# Patient Record
Sex: FEMALE | Race: WHITE | NOT HISPANIC OR LATINO | Employment: UNEMPLOYED
[De-identification: names, ages, dates, MRNs, and addresses within clinical notes are randomized per-mention and may not be internally consistent; named-entity substitution may affect disease eponyms.]

---

## 2017-08-05 ENCOUNTER — HEALTH MAINTENANCE LETTER (OUTPATIENT)
Age: 37
End: 2017-08-05

## 2018-05-08 ENCOUNTER — THERAPY VISIT (OUTPATIENT)
Dept: PHYSICAL THERAPY | Facility: CLINIC | Age: 38
End: 2018-05-08
Payer: COMMERCIAL

## 2018-05-08 DIAGNOSIS — M54.50 ACUTE BILATERAL LOW BACK PAIN WITHOUT SCIATICA: Primary | ICD-10-CM

## 2018-05-08 PROCEDURE — 97110 THERAPEUTIC EXERCISES: CPT | Mod: GP | Performed by: PHYSICAL THERAPIST

## 2018-05-08 PROCEDURE — 97161 PT EVAL LOW COMPLEX 20 MIN: CPT | Mod: GP | Performed by: PHYSICAL THERAPIST

## 2018-05-08 PROCEDURE — 97530 THERAPEUTIC ACTIVITIES: CPT | Mod: GP | Performed by: PHYSICAL THERAPIST

## 2018-05-08 NOTE — LETTER
[facility] ATHLETIC [facility] PHYSICAL THERAPY  [address]  [facility] [address]  [phone]    May 10, 2018    Re: Petty Tolliver   :   1980  MRN:  [id number]   REFERRING PHYSICIAN:   Nabila Woodard    [facility] ATHLETIC [facility] PHYSICAL THERAPY    Date of Initial Evaluation:  2018  Visits:   1  Reason for Referral:  Acute bilateral low back pain without sciatica    [facility] Athletic [facility] Initial Evaluation -- Lumbar  Date: May 8, 2018  Petty Tolliver is a 38 year old female with a thoracic/lumbar spine condition.   Referral: GP  Work mechanical stresses:  NA  Employment status:  Homemaker  Leisure mechanical stresses: No formal exercise  Functional disability score (SHERRI/STarT Back):  Pt did not complete  VAS score (0-10): 4/10  Patient goals/expectations:  Reduce pain in back and be able to sit for longer periods of time    HISTORY:  Present symptoms: Central lower thoracic and lumbar spine  Pain quality (sharp/shooting/stabbing/aching/burning/cramping):  pinching   Paresthesia (yes/no):  No  Present since (onset date): 2018     Symptoms (improving/unchanging/worsening):  improving.   Symptoms commenced as a result of: Rocking in rocking chair and hit back on metal bar   Condition occurred in the following environment:   Home   Symptoms at onset (back/thigh/leg): Back  Constant symptoms (back/thigh/leg): None  Intermittent symptoms (back/thigh/leg): Back  Symptoms are made worse with the following: Always Sitting, Time of day - Always AM and Always When still   Symptoms are made better with the following: Always on the move, Other - ibuprofen, Sitting on swiss ball and bending forward  Disturbed sleep (yes/no):  No   Sleeping postures (prone/sup/side R/L): supine now, unable to sides  Previous episodes (0/1-5/6-10/11+): 1-5   Year of first episode:   Previous history: Yes  Previous treatments: PT      Re: Petty Tolliver   :    1980    Specific Questions:  Cough/Sneeze/Strain (pos/neg): Neg  Bowel/Bladder (normal/abnormal): Normal  Gait (normal/abnormal): Normal  Medications (nil/NSAIDS/analg/steroids/anticoag/other):  None  Medical allergies:  Dental anesthesia  General health (excellent/good/fair/poor):  Fair - good  Pertinent medical history:  Overweight, High blood pressure, Heart problems, Mental illness and Anemia  Imaging (None/Xray/MRI/Other):  X-ray  Recent or major surgery (yes/no):  Knee 1994  Night pain (yes/no): No  Accidents (yes/no): No  Unexplained weight loss (yes/no): No  Barriers at home: None  Other red flags: None    EXAMINATION    Posture:   Sitting (good/fair/poor): Fair  Standing (good/fair/poor):Fair  Lordosis (red/acc/normal): Normal  Correction of posture (better/worse/no effect): Better  Lateral Shift (right/left/nil): Nil  Relevant (yes/no):  NA  Other Observations: NT    Neurological:  Motor deficit:  NT  Reflexes:  NT  Sensory deficit:  NT  Dural signs:  NT    Movement Loss:   Marbin Mod Min Nil Pain   Flexion   X     Extension    X    Side Gliding R   X  Pulls LB   Side Gliding L   X  Pulls LB   Test Movements:   During: produces, abolishes, increases, decreases, no effect, centralizing, peripheralizing   After: better, worse, no better, no worse, no effect, centralized, peripheralized    Pretest symptoms standing:    Symptoms During Symptoms After ROM increased ROM decreased No Effect   FIS        Rep FIS        EIS        Rep EIS        Re: Petty Tolliver   :   1980    Pretest symptoms lying:     Symptoms During Symptoms After ROM increased ROM decreased No Effect   ABRAN        Rep ABRAN        EIL        Rep EIL        If required, pretest symptoms:    Symptoms During Symptoms After ROM increased ROM decreased No Effect   SGIS - R        Rep SGIS - R        SGIS - L        Rep SGIS - L          Static Tests:  Sitting slouched:    Sitting erect:    Standing slouched   Standing erect:    Lying prone  in extension:   Long sitting:      Other Tests: FISit - 2 x 10 reps - NE, NE, Inc ROM (FF, (B) SGIS and abolishes end range pulling)    Provisional Classification:  Derangement - Bilateral, symmetrical, symptoms above knee    Principle of Management:  Education:  Posture, specificity of exercise     Equipment provided:  Lumbar roll  Mechanical therapy (Y/N):  Y   Extension principle:      Lateral Principle:    Flexion principle:  FISit x 10-15 reps every 2 hrs    Other:      ASSESSMENT/PLAN:  Patient is a 38 year old female with lumbar complaints.    Patient has the following significant findings with corresponding treatment plan.                Diagnosis 1:  LBP  Pain -  self management, education, directional preference exercise and home program  Decreased ROM/flexibility - manual therapy, therapeutic exercise and home program  Decreased joint mobility - manual therapy, therapeutic exercise and home program  Impaired muscle performance - neuro re-education and home program  Decreased function - therapeutic activities and home program  Impaired posture - neuro re-education and home program            Re: Petty Tolliver   :   1980    Therapy Evaluation Codes:   1) History comprised of:   Personal factors that impact the plan of care:      None.    Comorbidity factors that impact the plan of care are:      Heart problems, High blood pressure, Mental illness and Overweight.     Medications impacting care: None.  2) Examination of Body Systems comprised of:   Body structures and functions that impact the plan of care:      Lumbar spine.   Activity limitations that impact the plan of care are:      Sitting.  3) Clinical presentation characteristics are:   Evolving/Changing.  4) Decision-Making    Low complexity using standardized patient assessment instrument and/or   measureable assessment of functional outcome.  Cumulative Therapy Evaluation is: Low complexity.    Previous and current functional limitations:   (See Goal Flow Sheet for this information)    Short term and Long term goals: (See Goal Flow Sheet for this information)   Communication ability:  Patient appears to be able to clearly communicate and understand verbal and written communication and follow directions correctly.  Treatment Explanation - The following has been discussed with the patient:   RX ordered/plan of care, Anticipated outcomes, Possible risks and side effects    This patient would benefit from PT intervention to resume normal activities.   Rehab potential is good.  Frequency:  1 X week, once daily  Duration:  for 6 weeks  Discharge Plan:  Achieve all LTG.  Independent in home treatment program.  Reach maximal therapeutic benefit.    Thank you for your referral.    INQUIRIES        Therapist:  MARY IglesiasT, cert MDT  [facility] PHYSICAL THERAPY  [address] [facility] [address]  [facility] [address]  Phone: [phone]  Fax: [phone]

## 2018-05-08 NOTE — PROGRESS NOTES
[facility] Initial Evaluation -- Lumbar    Date: May 8, 2018  Petty Tolliver is a 38 year old female with a thoracic/lumbar spine condition.   Referral: GP  Work mechanical stresses:  NA  Employment status:  Homemaker  Leisure mechanical stresses: No formal exercise  Functional disability score (SHERRI/STarT Back):  Pt did not complete  VAS score (0-10): 4/10  Patient goals/expectations:  Reduce pain in back and be able to sit for longer periods of time    HISTORY:    Present symptoms: Central lower thoracic and lumbar spine  Pain quality (sharp/shooting/stabbing/aching/burning/cramping):  pinching   Paresthesia (yes/no):  No    Present since (onset date): March 2018     Symptoms (improving/unchanging/worsening):  improving.     Symptoms commenced as a result of: Rocking in rocking chair and hit back on metal bar   Condition occurred in the following environment:   Home     Symptoms at onset (back/thigh/leg): Back  Constant symptoms (back/thigh/leg): None  Intermittent symptoms (back/thigh/leg): Back    Symptoms are made worse with the following: Always Sitting, Time of day - Always AM and Always When still   Symptoms are made better with the following: Always on the move, Other - ibuprofen, Sitting on swiss ball and bending forward    Disturbed sleep (yes/no):  No Sleeping postures (prone/sup/side R/L): supine now, unable to sides    Previous episodes (0/1-5/6-10/11+): 1-5 Year of first episode: 2009    Previous history: Yes  Previous treatments: PT      Specific Questions:  Cough/Sneeze/Strain (pos/neg): Neg  Bowel/Bladder (normal/abnormal): Normal  Gait (normal/abnormal): Normal  Medications (nil/NSAIDS/analg/steroids/anticoag/other):  None  Medical allergies:  Dental anesthesia  General health (excellent/good/fair/poor):  Fair - good  Pertinent medical history:  Overweight, High blood pressure, Heart problems, Mental illness and Anemia  Imaging (None/Xray/MRI/Other):  X-ray  Recent or major  surgery (yes/no):  Knee 1994  Night pain (yes/no): No  Accidents (yes/no): No  Unexplained weight loss (yes/no): No  Barriers at home: None  Other red flags: None    EXAMINATION    Posture:   Sitting (good/fair/poor): Fair  Standing (good/fair/poor):Fair  Lordosis (red/acc/normal): Normal  Correction of posture (better/worse/no effect): Better    Lateral Shift (right/left/nil): Nil  Relevant (yes/no):  NA  Other Observations: NT    Neurological:    Motor deficit:  NT  Reflexes:  NT  Sensory deficit:  NT  Dural signs:  NT    Movement Loss:   Marbin Mod Min Nil Pain   Flexion   X     Extension    X    Side Gliding R   X  Pulls LB   Side Gliding L   X  Pulls LB     Test Movements:   During: produces, abolishes, increases, decreases, no effect, centralizing, peripheralizing   After: better, worse, no better, no worse, no effect, centralized, peripheralized    Pretest symptoms standing:    Symptoms During Symptoms After ROM increased ROM decreased No Effect   FIS        Rep FIS        EIS        Rep EIS        Pretest symptoms lying:     Symptoms During Symptoms After ROM increased ROM decreased No Effect   ABRAN        Rep ABRAN        EIL        Rep EIL        If required, pretest symptoms:    Symptoms During Symptoms After ROM increased ROM decreased No Effect   SGIS - R        Rep SGIS - R        SGIS - L        Rep SGIS - L          Static Tests:  Sitting slouched:    Sitting erect:    Standing slouched   Standing erect:    Lying prone in extension:   Long sitting:      Other Tests: FISit - 2 x 10 reps - NE, NE, Inc ROM (FF, (B) SGIS and abolishes end range pulling)    Provisional Classification:  Derangement - Bilateral, symmetrical, symptoms above knee    Principle of Management:  Education:  Posture, specificity of exercise   Equipment provided:  Lumbar roll  Mechanical therapy (Y/N):  Y   Extension principle:    Lateral Principle:    Flexion principle:  FISit x 10-15 reps every 2 hrs  Other:       ASSESSMENT/PLAN:    Patient is a 38 year old female with lumbar complaints.    Patient has the following significant findings with corresponding treatment plan.                Diagnosis 1:  LBP    Pain -  self management, education, directional preference exercise and home program  Decreased ROM/flexibility - manual therapy, therapeutic exercise and home program  Decreased joint mobility - manual therapy, therapeutic exercise and home program  Impaired muscle performance - neuro re-education and home program  Decreased function - therapeutic activities and home program  Impaired posture - neuro re-education and home program    Therapy Evaluation Codes:   1) History comprised of:   Personal factors that impact the plan of care:      None.    Comorbidity factors that impact the plan of care are:      Heart problems, High blood pressure, Mental illness and Overweight.     Medications impacting care: None.  2) Examination of Body Systems comprised of:   Body structures and functions that impact the plan of care:      Lumbar spine.   Activity limitations that impact the plan of care are:      Sitting.  3) Clinical presentation characteristics are:   Evolving/Changing.  4) Decision-Making    Low complexity using standardized patient assessment instrument and/or measureable assessment of functional outcome.  Cumulative Therapy Evaluation is: Low complexity.    Previous and current functional limitations:  (See Goal Flow Sheet for this information)    Short term and Long term goals: (See Goal Flow Sheet for this information)     Communication ability:  Patient appears to be able to clearly communicate and understand verbal and written communication and follow directions correctly.  Treatment Explanation - The following has been discussed with the patient:   RX ordered/plan of care  Anticipated outcomes  Possible risks and side effects  This patient would benefit from PT intervention to resume normal activities.   Rehab  potential is good.    Frequency:  1 X week, once daily  Duration:  for 6 weeks  Discharge Plan:  Achieve all LTG.  Independent in home treatment program.  Reach maximal therapeutic benefit.    Please refer to the daily flowsheet for treatment today, total treatment time and time spent performing 1:1 timed codes.

## 2018-05-15 ENCOUNTER — THERAPY VISIT (OUTPATIENT)
Dept: PHYSICAL THERAPY | Facility: CLINIC | Age: 38
End: 2018-05-15
Payer: COMMERCIAL

## 2018-05-15 DIAGNOSIS — M54.50 ACUTE BILATERAL LOW BACK PAIN WITHOUT SCIATICA: ICD-10-CM

## 2018-05-15 PROCEDURE — 97110 THERAPEUTIC EXERCISES: CPT | Mod: GP | Performed by: PHYSICAL THERAPIST

## 2018-05-15 NOTE — PROGRESS NOTES
Subjective:  HPI                    Objective:  System    Physical Exam    General     ROS    Assessment/Plan:    PROGRESS  REPORT    Progress reporting period is from 5.8.18 to 5.15.18.       SUBJECTIVE  Subjective changes noted by patient:  Pt reports pain is much better.  Not noticing hardly any pain except if sitting for prolonged period will feel pain localized in low back region but sitting has been much better using lumbar roll.  Has been doing stretch 3-4 x daily.    Current Pain level: 0/10.     Initial Pain level: 4/10.   Changes in function:  Yes (See Goal flowsheet attached for changes in current functional level)  Adverse reaction to treatment or activity: None    OBJECTIVE  L/S AROM:  Flex WNL; Ext WNL; SG (R) WNL/end range pull Left lat hip that abolishes after performing repeated FISit (L) WNL.     ASSESSMENT/PLAN  Updated problem list and treatment plan: Diagnosis 1:  LBP    Decreased ROM/flexibility - therapeutic exercise and home program  Decreased joint mobility - therapeutic exercise and home program  Impaired posture - neuro re-education and home program  STG/LTGs have been met or progress has been made towards goals:  Yes (See Goal flow sheet completed today.)  Assessment of Progress: The patient's condition is improving.  The patient has met all of their long term goals.  Self Management Plans:  Patient is independent in a home treatment program.  Patient is independent in self management of symptoms.  I have re-evaluated this patient and find that the nature, scope, duration and intensity of the therapy is appropriate for the medical condition of the patient.  Petty continues to require the following intervention to meet STG and LTG's:  PT intervention is no longer required to meet STG/LTG.    Recommendations:  Pt will continue with HEP as instructed.  Follow up prn over the next 6 weeks if problems arise/symptoms worsen.  D/C at that time if pt does not return and this progress note to  serve as D/C status.      Please refer to the daily flowsheet for treatment today, total treatment time and time spent performing 1:1 timed codes.

## 2018-05-15 NOTE — LETTER
[facility] ATHLETIC [facility] PHYSICAL THERAPY  [address]  [facility] [address]  [phone]    May 16, 2018    Re: Petty Tolliver   :   1980  MRN:  [id number]   REFERRING PHYSICIAN:   Nabila Woodard    [facility] ATHLETIC [facility] PHYSICAL THERAPY    Date of Initial Evaluation:  2018  Visits:    2  Reason for Referral:  Acute bilateral low back pain without sciatica    PROGRESS  REPORT: Progress reporting period is from 5.8.18 to 5.15.18.       SUBJECTIVE  Subjective changes noted by patient:  Pt reports pain is much better.  Not noticing hardly any pain except if sitting for prolonged period will feel pain localized in low back region but sitting has been much better using lumbar roll.  Has been doing stretch 3-4 x daily.    Current Pain level: 0/10.   Initial Pain level: 4/10.   Changes in function:  Yes (See Goal flowsheet attached for changes in current functional level). Adverse reaction to treatment or activity: None    OBJECTIVE  L/S AROM:  Flex WNL; Ext WNL; SG (R) WNL/end range pull Left lat hip that abolishes after performing repeated FISit (L) WNL.     ASSESSMENT/PLAN  Updated problem list and treatment plan: Diagnosis 1:  LBP  Decreased ROM/flexibility - therapeutic exercise and home program  Decreased joint mobility - therapeutic exercise and home program  Impaired posture - neuro re-education and home program  STG/LTGs have been met or progress has been made towards goals:  Yes (See Goal flow sheet completed today.)  Assessment of Progress: The patient's condition is improving.  The patient has met all of their long term goals.  Self Management Plans:  Patient is independent in a home treatment program.  Patient is independent in self management of symptoms.  I have re-evaluated this patient and find that the nature, scope, duration and intensity of the therapy is appropriate for the medical condition of the patient.  Petty continues to require  the following intervention to meet STG and LTG's:  PT intervention is no longer required to meet STG/LTG.              Re: Petty ALE Tolliver   :   1980    Recommendations:  Pt will continue with HEP as instructed.  Follow up prn over the next 6 weeks if problems arise/symptoms worsen.  D/C at that time if pt does not return and this progress note to serve as D/C status.    Thank you for your referral.    INQUIRIES        Therapist:  MARY IglesiasT, cert MDT  [facility] PHYSICAL THERAPY  [address] [facility] [address]  [facility] [address]  Phone: [phone]  Fax: [phone]

## 2020-01-14 PROBLEM — M54.50 ACUTE BILATERAL LOW BACK PAIN WITHOUT SCIATICA: Status: RESOLVED | Noted: 2018-05-08 | Resolved: 2020-01-14

## 2020-02-16 ENCOUNTER — HEALTH MAINTENANCE LETTER (OUTPATIENT)
Age: 40
End: 2020-02-16

## 2021-05-26 ENCOUNTER — RECORDS - HEALTHEAST (OUTPATIENT)
Dept: ADMINISTRATIVE | Facility: CLINIC | Age: 41
End: 2021-05-26

## 2021-05-27 ENCOUNTER — RECORDS - HEALTHEAST (OUTPATIENT)
Dept: ADMINISTRATIVE | Facility: CLINIC | Age: 41
End: 2021-05-27

## 2022-03-04 ENCOUNTER — LAB REQUISITION (OUTPATIENT)
Dept: LAB | Facility: CLINIC | Age: 42
End: 2022-03-04
Payer: COMMERCIAL

## 2022-03-04 DIAGNOSIS — Z01.818 ENCOUNTER FOR OTHER PREPROCEDURAL EXAMINATION: ICD-10-CM

## 2022-03-04 PROCEDURE — U0005 INFEC AGEN DETEC AMPLI PROBE: HCPCS | Mod: ORL | Performed by: NURSE PRACTITIONER

## 2022-03-05 LAB — SARS-COV-2 RNA RESP QL NAA+PROBE: NEGATIVE

## 2022-08-05 ENCOUNTER — LAB REQUISITION (OUTPATIENT)
Dept: LAB | Facility: CLINIC | Age: 42
End: 2022-08-05

## 2022-08-05 DIAGNOSIS — E55.9 VITAMIN D DEFICIENCY, UNSPECIFIED: ICD-10-CM

## 2022-08-05 DIAGNOSIS — D50.0 IRON DEFICIENCY ANEMIA SECONDARY TO BLOOD LOSS (CHRONIC): ICD-10-CM

## 2022-08-05 LAB — FERRITIN SERPL-MCNC: 50 NG/ML (ref 6–175)

## 2022-08-05 PROCEDURE — 82306 VITAMIN D 25 HYDROXY: CPT | Performed by: FAMILY MEDICINE

## 2022-08-05 PROCEDURE — 82728 ASSAY OF FERRITIN: CPT | Performed by: FAMILY MEDICINE

## 2022-08-05 PROCEDURE — 83550 IRON BINDING TEST: CPT | Performed by: FAMILY MEDICINE

## 2022-08-06 LAB
IRON SATN MFR SERPL: 40 % (ref 15–46)
IRON SERPL-MCNC: 133 UG/DL (ref 35–180)
TIBC SERPL-MCNC: 331 UG/DL (ref 240–430)

## 2022-08-08 LAB — DEPRECATED CALCIDIOL+CALCIFEROL SERPL-MC: 57 UG/L (ref 20–75)

## 2022-09-02 ENCOUNTER — LAB REQUISITION (OUTPATIENT)
Dept: LAB | Facility: CLINIC | Age: 42
End: 2022-09-02

## 2022-09-02 DIAGNOSIS — R68.89 OTHER GENERAL SYMPTOMS AND SIGNS: ICD-10-CM

## 2022-09-02 DIAGNOSIS — Z13.6 ENCOUNTER FOR SCREENING FOR CARDIOVASCULAR DISORDERS: ICD-10-CM

## 2022-09-02 DIAGNOSIS — Z13.1 ENCOUNTER FOR SCREENING FOR DIABETES MELLITUS: ICD-10-CM

## 2022-09-02 LAB
GLUCOSE SERPL-MCNC: 78 MG/DL (ref 70–99)
TSH SERPL DL<=0.005 MIU/L-ACNC: 1.11 UIU/ML (ref 0.3–4.2)

## 2022-09-02 PROCEDURE — 80061 LIPID PANEL: CPT | Performed by: FAMILY MEDICINE

## 2022-09-02 PROCEDURE — 84443 ASSAY THYROID STIM HORMONE: CPT | Performed by: FAMILY MEDICINE

## 2022-09-02 PROCEDURE — 82947 ASSAY GLUCOSE BLOOD QUANT: CPT | Performed by: FAMILY MEDICINE

## 2022-09-03 LAB
CHOLEST SERPL-MCNC: 236 MG/DL
HDLC SERPL-MCNC: 37 MG/DL
LDLC SERPL CALC-MCNC: 167 MG/DL
NONHDLC SERPL-MCNC: 199 MG/DL
TRIGL SERPL-MCNC: 159 MG/DL

## 2023-05-09 ENCOUNTER — LAB REQUISITION (OUTPATIENT)
Dept: LAB | Facility: CLINIC | Age: 43
End: 2023-05-09

## 2023-05-09 DIAGNOSIS — R19.5 OTHER FECAL ABNORMALITIES: ICD-10-CM

## 2023-05-09 PROCEDURE — 83516 IMMUNOASSAY NONANTIBODY: CPT | Performed by: FAMILY MEDICINE

## 2023-05-09 PROCEDURE — 82784 ASSAY IGA/IGD/IGG/IGM EACH: CPT | Performed by: FAMILY MEDICINE

## 2023-05-11 ENCOUNTER — LAB REQUISITION (OUTPATIENT)
Dept: LAB | Facility: CLINIC | Age: 43
End: 2023-05-11

## 2023-05-11 DIAGNOSIS — R19.5 OTHER FECAL ABNORMALITIES: ICD-10-CM

## 2023-05-11 LAB
C DIFF TOX B STL QL: NEGATIVE
GLIADIN IGA SER-ACNC: 1.1 U/ML
GLIADIN IGG SER-ACNC: <0.6 U/ML
IGA SERPL-MCNC: 117 MG/DL (ref 84–499)
TTG IGA SER-ACNC: 0.2 U/ML
TTG IGG SER-ACNC: 1 U/ML

## 2023-05-11 PROCEDURE — 87209 SMEAR COMPLEX STAIN: CPT | Performed by: FAMILY MEDICINE

## 2023-05-11 PROCEDURE — 87506 IADNA-DNA/RNA PROBE TQ 6-11: CPT | Performed by: FAMILY MEDICINE

## 2023-05-11 PROCEDURE — 87493 C DIFF AMPLIFIED PROBE: CPT | Performed by: FAMILY MEDICINE

## 2023-05-12 LAB
C COLI+JEJUNI+LARI FUSA STL QL NAA+PROBE: NOT DETECTED
EC STX1 GENE STL QL NAA+PROBE: NOT DETECTED
EC STX2 GENE STL QL NAA+PROBE: NOT DETECTED
NOROV GI+II ORF1-ORF2 JNC STL QL NAA+PR: NOT DETECTED
O+P STL MICRO: NEGATIVE
RVA NSP5 STL QL NAA+PROBE: NOT DETECTED
SALMONELLA SP RPOD STL QL NAA+PROBE: NOT DETECTED
SHIGELLA SP+EIEC IPAH STL QL NAA+PROBE: NOT DETECTED
V CHOL+PARA RFBL+TRKH+TNAA STL QL NAA+PR: NOT DETECTED
Y ENTERO RECN STL QL NAA+PROBE: NOT DETECTED

## 2023-09-08 ENCOUNTER — LAB REQUISITION (OUTPATIENT)
Dept: LAB | Facility: CLINIC | Age: 43
End: 2023-09-08

## 2023-09-08 DIAGNOSIS — E78.2 MIXED HYPERLIPIDEMIA: ICD-10-CM

## 2023-09-08 LAB
ALBUMIN SERPL BCG-MCNC: 4.1 G/DL (ref 3.5–5.2)
ALP SERPL-CCNC: 79 U/L (ref 35–104)
ALT SERPL W P-5'-P-CCNC: 23 U/L (ref 0–50)
ANION GAP SERPL CALCULATED.3IONS-SCNC: 13 MMOL/L (ref 7–15)
AST SERPL W P-5'-P-CCNC: 23 U/L (ref 0–45)
BILIRUB SERPL-MCNC: 0.3 MG/DL
BUN SERPL-MCNC: 12.1 MG/DL (ref 6–20)
CALCIUM SERPL-MCNC: 8.8 MG/DL (ref 8.6–10)
CHLORIDE SERPL-SCNC: 104 MMOL/L (ref 98–107)
CHOLEST SERPL-MCNC: 249 MG/DL
CREAT SERPL-MCNC: 0.62 MG/DL (ref 0.51–0.95)
DEPRECATED HCO3 PLAS-SCNC: 20 MMOL/L (ref 22–29)
EGFRCR SERPLBLD CKD-EPI 2021: >90 ML/MIN/1.73M2
GLUCOSE SERPL-MCNC: 86 MG/DL (ref 70–99)
HDLC SERPL-MCNC: 33 MG/DL
LDLC SERPL CALC-MCNC: 186 MG/DL
NONHDLC SERPL-MCNC: 216 MG/DL
POTASSIUM SERPL-SCNC: 4.2 MMOL/L (ref 3.4–5.3)
PROT SERPL-MCNC: 6.7 G/DL (ref 6.4–8.3)
SODIUM SERPL-SCNC: 137 MMOL/L (ref 136–145)
TRIGL SERPL-MCNC: 149 MG/DL

## 2023-09-08 PROCEDURE — 80053 COMPREHEN METABOLIC PANEL: CPT | Performed by: FAMILY MEDICINE

## 2023-09-08 PROCEDURE — 80061 LIPID PANEL: CPT | Performed by: FAMILY MEDICINE

## 2023-12-19 ENCOUNTER — LAB REQUISITION (OUTPATIENT)
Dept: LAB | Facility: CLINIC | Age: 43
End: 2023-12-19

## 2023-12-19 DIAGNOSIS — R63.1 POLYDIPSIA: ICD-10-CM

## 2023-12-19 DIAGNOSIS — R35.0 FREQUENCY OF MICTURITION: ICD-10-CM

## 2023-12-19 DIAGNOSIS — R03.0 ELEVATED BLOOD-PRESSURE READING, WITHOUT DIAGNOSIS OF HYPERTENSION: ICD-10-CM

## 2023-12-19 DIAGNOSIS — J02.9 ACUTE PHARYNGITIS, UNSPECIFIED: ICD-10-CM

## 2023-12-19 LAB
ALBUMIN SERPL BCG-MCNC: 4.1 G/DL (ref 3.5–5.2)
ALP SERPL-CCNC: 74 U/L (ref 40–150)
ALT SERPL W P-5'-P-CCNC: 15 U/L (ref 0–50)
ANION GAP SERPL CALCULATED.3IONS-SCNC: 13 MMOL/L (ref 7–15)
AST SERPL W P-5'-P-CCNC: 16 U/L (ref 0–45)
BILIRUB SERPL-MCNC: 0.2 MG/DL
BUN SERPL-MCNC: 7.1 MG/DL (ref 6–20)
CALCIUM SERPL-MCNC: 9 MG/DL (ref 8.6–10)
CHLORIDE SERPL-SCNC: 105 MMOL/L (ref 98–107)
CREAT SERPL-MCNC: 0.6 MG/DL (ref 0.51–0.95)
DEPRECATED HCO3 PLAS-SCNC: 22 MMOL/L (ref 22–29)
EGFRCR SERPLBLD CKD-EPI 2021: >90 ML/MIN/1.73M2
GLUCOSE SERPL-MCNC: 85 MG/DL (ref 70–99)
POTASSIUM SERPL-SCNC: 4.5 MMOL/L (ref 3.4–5.3)
PROT SERPL-MCNC: 7.6 G/DL (ref 6.4–8.3)
SODIUM SERPL-SCNC: 140 MMOL/L (ref 135–145)
TSH SERPL DL<=0.005 MIU/L-ACNC: 1.89 UIU/ML (ref 0.3–4.2)

## 2023-12-19 PROCEDURE — 87070 CULTURE OTHR SPECIMN AEROBIC: CPT | Performed by: FAMILY MEDICINE

## 2023-12-19 PROCEDURE — 80053 COMPREHEN METABOLIC PANEL: CPT | Performed by: FAMILY MEDICINE

## 2023-12-19 PROCEDURE — 84443 ASSAY THYROID STIM HORMONE: CPT | Performed by: FAMILY MEDICINE

## 2023-12-19 PROCEDURE — 87086 URINE CULTURE/COLONY COUNT: CPT | Performed by: FAMILY MEDICINE

## 2023-12-21 LAB
BACTERIA SPEC CULT: NORMAL
BACTERIA UR CULT: NORMAL

## 2024-07-13 ENCOUNTER — LAB REQUISITION (OUTPATIENT)
Dept: LAB | Facility: CLINIC | Age: 44
End: 2024-07-13

## 2024-07-13 DIAGNOSIS — S70.362A INSECT BITE (NONVENOMOUS), LEFT THIGH, INITIAL ENCOUNTER (CODE): ICD-10-CM

## 2024-07-13 PROCEDURE — 87798 DETECT AGENT NOS DNA AMP: CPT | Performed by: FAMILY MEDICINE

## 2024-07-13 PROCEDURE — 86618 LYME DISEASE ANTIBODY: CPT | Performed by: FAMILY MEDICINE

## 2024-07-14 LAB
A PHAGOCYTOPH DNA BLD QL NAA+PROBE: NOT DETECTED
BABESIA DNA BLD QL NAA+PROBE: NOT DETECTED
EHRLICHIA DNA SPEC QL NAA+PROBE: NOT DETECTED

## 2024-07-15 LAB — B BURGDOR IGG+IGM SER QL: 0.18

## 2024-08-19 ENCOUNTER — LAB REQUISITION (OUTPATIENT)
Dept: LAB | Facility: HOSPITAL | Age: 44
End: 2024-08-19
Payer: COMMERCIAL

## 2024-08-19 DIAGNOSIS — E53.9 VITAMIN B DEFICIENCY, UNSPECIFIED: ICD-10-CM

## 2024-08-19 DIAGNOSIS — K14.0 GLOSSITIS: ICD-10-CM

## 2024-08-19 DIAGNOSIS — E16.2 HYPOGLYCEMIA, UNSPECIFIED: ICD-10-CM

## 2024-08-19 DIAGNOSIS — H81.399 OTHER PERIPHERAL VERTIGO, UNSPECIFIED EAR: ICD-10-CM

## 2024-08-19 LAB
FOLATE SERPL-MCNC: 11.6 NG/ML (ref 4.6–34.8)
HBA1C MFR BLD: 5.3 %
IRON SERPL-MCNC: 75 UG/DL (ref 37–145)

## 2024-08-19 PROCEDURE — 84630 ASSAY OF ZINC: CPT | Mod: ORL | Performed by: OTOLARYNGOLOGY

## 2024-08-19 PROCEDURE — 83540 ASSAY OF IRON: CPT | Mod: ORL | Performed by: OTOLARYNGOLOGY

## 2024-08-19 PROCEDURE — 36415 COLL VENOUS BLD VENIPUNCTURE: CPT | Mod: ORL | Performed by: OTOLARYNGOLOGY

## 2024-08-19 PROCEDURE — 82607 VITAMIN B-12: CPT | Mod: ORL | Performed by: OTOLARYNGOLOGY

## 2024-08-19 PROCEDURE — 84207 ASSAY OF VITAMIN B-6: CPT | Mod: ORL | Performed by: OTOLARYNGOLOGY

## 2024-08-19 PROCEDURE — 83036 HEMOGLOBIN GLYCOSYLATED A1C: CPT | Mod: ORL | Performed by: OTOLARYNGOLOGY

## 2024-08-19 PROCEDURE — 82746 ASSAY OF FOLIC ACID SERUM: CPT | Mod: ORL | Performed by: OTOLARYNGOLOGY

## 2024-08-20 LAB — VIT B12 SERPL-MCNC: 293 PG/ML (ref 232–1245)

## 2024-08-22 LAB
PYRIDOXAL PHOS SERPL-SCNC: 25.7 NMOL/L
ZINC SERPL-MCNC: 67 UG/DL

## 2024-08-24 ENCOUNTER — HOSPITAL ENCOUNTER (EMERGENCY)
Facility: HOSPITAL | Age: 44
Discharge: HOME | End: 2024-08-24
Attending: EMERGENCY MEDICINE | Admitting: EMERGENCY MEDICINE
Payer: COMMERCIAL

## 2024-08-24 ENCOUNTER — APPOINTMENT (OUTPATIENT)
Dept: RADIOLOGY | Facility: HOSPITAL | Age: 44
End: 2024-08-24
Attending: EMERGENCY MEDICINE
Payer: COMMERCIAL

## 2024-08-24 VITALS
RESPIRATION RATE: 16 BRPM | BODY MASS INDEX: 38.72 KG/M2 | SYSTOLIC BLOOD PRESSURE: 123 MMHG | TEMPERATURE: 98.6 F | DIASTOLIC BLOOD PRESSURE: 79 MMHG | OXYGEN SATURATION: 96 % | WEIGHT: 226.8 LBS | HEART RATE: 63 BPM | HEIGHT: 64 IN

## 2024-08-24 DIAGNOSIS — R00.2 PALPITATIONS: ICD-10-CM

## 2024-08-24 LAB
ANION GAP SERPL CALCULATED.3IONS-SCNC: 11 MMOL/L (ref 7–15)
BUN SERPL-MCNC: 8.1 MG/DL (ref 6–20)
CALCIUM SERPL-MCNC: 8.3 MG/DL (ref 8.8–10.4)
CHLORIDE SERPL-SCNC: 106 MMOL/L (ref 98–107)
CREAT SERPL-MCNC: 0.57 MG/DL (ref 0.51–0.95)
EGFRCR SERPLBLD CKD-EPI 2021: >90 ML/MIN/1.73M2
ERYTHROCYTE [DISTWIDTH] IN BLOOD BY AUTOMATED COUNT: 14.5 % (ref 10–15)
GLUCOSE SERPL-MCNC: 101 MG/DL (ref 70–99)
HCO3 SERPL-SCNC: 22 MMOL/L (ref 22–29)
HCT VFR BLD AUTO: 37.9 % (ref 35–47)
HGB BLD-MCNC: 12.2 G/DL (ref 11.7–15.7)
MAGNESIUM SERPL-MCNC: 2.3 MG/DL (ref 1.7–2.3)
MCH RBC QN AUTO: 29.1 PG (ref 26.5–33)
MCHC RBC AUTO-ENTMCNC: 32.2 G/DL (ref 31.5–36.5)
MCV RBC AUTO: 91 FL (ref 78–100)
PLATELET # BLD AUTO: 356 10E3/UL (ref 150–450)
POTASSIUM SERPL-SCNC: 4.3 MMOL/L (ref 3.4–5.3)
RBC # BLD AUTO: 4.19 10E6/UL (ref 3.8–5.2)
SODIUM SERPL-SCNC: 139 MMOL/L (ref 135–145)
TROPONIN T SERPL HS-MCNC: <6 NG/L
WBC # BLD AUTO: 10.4 10E3/UL (ref 4–11)

## 2024-08-24 PROCEDURE — 71045 X-RAY EXAM CHEST 1 VIEW: CPT

## 2024-08-24 PROCEDURE — 80048 BASIC METABOLIC PNL TOTAL CA: CPT | Performed by: EMERGENCY MEDICINE

## 2024-08-24 PROCEDURE — 85049 AUTOMATED PLATELET COUNT: CPT | Performed by: EMERGENCY MEDICINE

## 2024-08-24 PROCEDURE — 93005 ELECTROCARDIOGRAM TRACING: CPT | Performed by: STUDENT IN AN ORGANIZED HEALTH CARE EDUCATION/TRAINING PROGRAM

## 2024-08-24 PROCEDURE — 36415 COLL VENOUS BLD VENIPUNCTURE: CPT | Performed by: EMERGENCY MEDICINE

## 2024-08-24 PROCEDURE — 93005 ELECTROCARDIOGRAM TRACING: CPT | Performed by: EMERGENCY MEDICINE

## 2024-08-24 PROCEDURE — 84484 ASSAY OF TROPONIN QUANT: CPT | Performed by: EMERGENCY MEDICINE

## 2024-08-24 PROCEDURE — 83735 ASSAY OF MAGNESIUM: CPT | Performed by: EMERGENCY MEDICINE

## 2024-08-24 PROCEDURE — 99285 EMERGENCY DEPT VISIT HI MDM: CPT | Mod: 25

## 2024-08-24 ASSESSMENT — ACTIVITIES OF DAILY LIVING (ADL)
ADLS_ACUITY_SCORE: 35
ADLS_ACUITY_SCORE: 35

## 2024-08-24 ASSESSMENT — COLUMBIA-SUICIDE SEVERITY RATING SCALE - C-SSRS
6. HAVE YOU EVER DONE ANYTHING, STARTED TO DO ANYTHING, OR PREPARED TO DO ANYTHING TO END YOUR LIFE?: NO
1. IN THE PAST MONTH, HAVE YOU WISHED YOU WERE DEAD OR WISHED YOU COULD GO TO SLEEP AND NOT WAKE UP?: NO
2. HAVE YOU ACTUALLY HAD ANY THOUGHTS OF KILLING YOURSELF IN THE PAST MONTH?: NO

## 2024-08-24 NOTE — ED TRIAGE NOTES
"Patient ambulates to triage. Endorses at 1245pm today, was reclining in a chair and started to experience heart palpitations that lasted about 1 minute.  In this episode, she felt lightheaded like she was going to pass out.      Has had this happen before but \"not to this extent\".     Took ibuprofen today at noon d/t having a migraine. Denies pain at this time.  Denies chest pain.            "

## 2024-08-24 NOTE — ED PROVIDER NOTES
.EMERGENCY DEPARTMENT ENCOUNTER      NAME: Petty Tolliver  AGE: 44 year old female  YOB: 1980  MRN: [id number]  EVALUATION DATE & TIME: 8/24/2024  1:22 PM    PCP: Nabila Woodard    ED PROVIDER: Onofre Lucio M.D.      Chief Complaint   Patient presents with    Palpitations         FINAL IMPRESSION:  Palpitation      ED COURSE & MEDICAL DECISION MAKING:    Pertinent Labs & Imaging studies reviewed. (See chart for details)  44 year old female presents to the Emergency Department for evaluation of palpitations.  Patient reports she was sitting when she suddenly felt like her heart was irregular.  No obvious racing.  Symptoms lasted for perhaps 1 minute.  Now resolved.  Patient concerned as she reports having an enlarged heart many years ago after giving birth.  Had follow-up thereafter and this seemed to normalize.  No new medications or excessive caffeine use.  Exam reveals obese female mild distress.  Vital signs unremarkable.  EKG normal.  Patient with palpitations uncertain etiology.  Will obtain baseline blood work to assess for electrolyte imbalance, anemia.  Also obtain chest x-ray to assess cardiac silhouette.. Patient appears non toxic with stable vitals signs. Overall exam is benign.        01:33 PM I met with the patient for the initial interview and physical examination. Discussed plan for treatment and workup in the ED.    2:27 PM.  Electrolytes unremarkable other than minimal hypocalcemia with calcium of 8.3.  Magnesium normal.  Troponin normal CBC normal.  Chest x-ray pending  2:54 PM.  Chest x-ray reviewed.  Cardiac silhouette normal.  No ST infiltrative disease.  Patient with simple palpitations.  Unlikely represents significant pathology given her age.  No indications for additional investigations, hospitalization.  Patient referred to her primary care physician.  May require Zio patch if symptoms persist  At the conclusion of the encounter I discussed the results of all of the tests  and the disposition. The questions were answered and return precautions provided. The patient or family acknowledged understanding and was agreeable with the care plan.           MEDICATIONS GIVEN IN THE EMERGENCY:  Medications - No data to display    NEW PRESCRIPTIONS STARTED AT TODAY'S ER VISIT  New Prescriptions    No medications on file          =================================================================    HPI    Patient information was obtained from: Patient      Petty Tolliver is a 44 year old female with a pertient medical history of Anemia and Obesity who presents to the ED for evaluation of palpitations.    Patient states that at 1245 they experienced palpitations where they described their heart beating a little hear on both the bottom and top of the chest. They report the palpitations lasting about 1 minute. Patient states that they have not had a similar experience before. Claims that after giving birth, they followed up with cardiology and has determined to be fully recovered. Patient is taking progesterone and vitamin D supplements. Patient reports not taking any other supplements. Patient states that they have been drinking a little more coffee than normal. They are experiencing increased thirst. They report that their stool is harder than normal. They deny weight loss, diarrhea, and emesis. Patient denies a Hx of DM or heart problems.      REVIEW OF SYSTEMS   Constitutional:  Denies fever, chills  Respiratory:  Denies productive cough or increased work of breathing  Cardiovascular:  Denies chest pain, palpitations  GI:  Denies abdominal pain, nausea, vomiting, or change in bowel or bladder habits   Musculoskeletal:  Denies any new muscle/joint swelling  Skin:  Denies rash   Neurologic:  Denies focal weakness  All systems negative except as marked.     PAST MEDICAL HISTORY:  No past medical history on file.    PAST SURGICAL HISTORY:  No past surgical history on file.      CURRENT MEDICATIONS:   "  No current facility-administered medications for this encounter.  No current outpatient medications on file.    ALLERGIES:  Allergies   Allergen Reactions    Epinephrine Palpitations     \"Heart racing\"       FAMILY HISTORY:  No family history on file.    SOCIAL HISTORY:   Social History     Socioeconomic History    Marital status:        VITALS:  Patient Vitals for the past 24 hrs:   BP Temp Temp src Pulse Resp SpO2 Height Weight   08/24/24 1335 134/77 -- -- 73 15 99 % -- --   08/24/24 1320 137/86 98.6  F (37  C) Oral 72 18 98 % 1.626 m (5' 4\") 102.9 kg (226 lb 12.8 oz)        PHYSICAL EXAM    Constitutional:  Awake, alert, in no apparent distress  HENT:  Normocephalic, Atraumatic. Bilateral external ears normal. Oropharynx moist. Nose normal. Neck- Normal range of motion with no guarding, Supple, No stridor.   Eyes:  PERRL, EOMI with no signs of entrapment, Conjunctiva normal, No discharge.   Respiratory:  Normal breath sounds, No respiratory distress, No wheezing.    Cardiovascular:  Normal heart rate, Normal rhythm, No appreciable rubs or gallops.   GI:  Soft, No tenderness, No distension, No palpable masses  Musculoskeletal: No edema. Good range of motion in all major joints. No tenderness to palpation or major deformities noted.  Integument:  Warm, Dry, No erythema, No rash.   Neurologic:  Alert & oriented, Normal motor function, Normal sensory function, No focal deficits noted.   Psychiatric:  Affect normal, Judgment normal, Mood normal.     LAB:  All pertinent labs reviewed and interpreted.     Results for orders placed or performed during the hospital encounter of 08/24/24   Troponin T, High Sensitivity     Status: Normal   Result Value Ref Range    Troponin T, High Sensitivity <6 <=14 ng/L   Basic metabolic panel     Status: Abnormal   Result Value Ref Range    Sodium 139 135 - 145 mmol/L    Potassium 4.3 3.4 - 5.3 mmol/L    Chloride 106 98 - 107 mmol/L    Carbon Dioxide (CO2) 22 22 - 29 mmol/L    " Anion Gap 11 7 - 15 mmol/L    Urea Nitrogen 8.1 6.0 - 20.0 mg/dL    Creatinine 0.57 0.51 - 0.95 mg/dL    GFR Estimate >90 >60 mL/min/1.73m2    Calcium 8.3 (L) 8.8 - 10.4 mg/dL    Glucose 101 (H) 70 - 99 mg/dL   Magnesium     Status: Normal   Result Value Ref Range    Magnesium 2.3 1.7 - 2.3 mg/dL   CBC (+ platelets, no diff)     Status: Normal   Result Value Ref Range    WBC Count 10.4 4.0 - 11.0 10e3/uL    RBC Count 4.19 3.80 - 5.20 10e6/uL    Hemoglobin 12.2 11.7 - 15.7 g/dL    Hematocrit 37.9 35.0 - 47.0 %    MCV 91 78 - 100 fL    MCH 29.1 26.5 - 33.0 pg    MCHC 32.2 31.5 - 36.5 g/dL    RDW 14.5 10.0 - 15.0 %    Platelet Count 356 150 - 450 10e3/uL      RADIOLOGY:  Reviewed all pertinent imaging. Please see official radiology report.  XR Chest Port 1 View    (Results Pending)       EKG:    Normal sinus rhythm with sinus arrhythmia.  Rate of 67.  Normal QRS.  Normal ST segments.  Normal EKG.  When compared to January 6, 2006 T wave inversions no longer present in inferior leads  I have independently reviewed and interpreted the EKG(s) documented above.           I, Jacques Gutierrez, am serving as a scribe to document services personally performed by Onofre Lucio MD, based on my observation and the provider's statements to me. I, Onofre Lucio MD attest that Jacques Gutierrez is acting in a scribe capacity, has observed my performance of the services and has documented them in accordance with my direction.    Onofre Lucio M.D.  Emergency Medicine  [facility] EMERGENCY DEPARTMENT     Onofre Lucio MD  08/24/24 4414       Onofre Lucio MD  08/24/24 1247

## 2024-09-01 LAB
ATRIAL RATE - MUSE: 67 BPM
DIASTOLIC BLOOD PRESSURE - MUSE: NORMAL MMHG
INTERPRETATION ECG - MUSE: NORMAL
P AXIS - MUSE: 28 DEGREES
PR INTERVAL - MUSE: 126 MS
QRS DURATION - MUSE: 92 MS
QT - MUSE: 384 MS
QTC - MUSE: 405 MS
R AXIS - MUSE: 33 DEGREES
SYSTOLIC BLOOD PRESSURE - MUSE: NORMAL MMHG
T AXIS - MUSE: 41 DEGREES
VENTRICULAR RATE- MUSE: 67 BPM

## 2024-09-09 ENCOUNTER — LAB REQUISITION (OUTPATIENT)
Dept: LAB | Facility: CLINIC | Age: 44
End: 2024-09-09

## 2024-09-09 DIAGNOSIS — E78.2 MIXED HYPERLIPIDEMIA: ICD-10-CM

## 2024-09-09 DIAGNOSIS — E55.9 VITAMIN D DEFICIENCY, UNSPECIFIED: ICD-10-CM

## 2024-09-09 LAB
CHOLEST SERPL-MCNC: 212 MG/DL
FASTING STATUS PATIENT QL REPORTED: ABNORMAL
HDLC SERPL-MCNC: 35 MG/DL
LDLC SERPL CALC-MCNC: 156 MG/DL
NONHDLC SERPL-MCNC: 177 MG/DL
TRIGL SERPL-MCNC: 103 MG/DL
VIT D+METAB SERPL-MCNC: 58 NG/ML (ref 20–50)

## 2024-09-09 PROCEDURE — 82306 VITAMIN D 25 HYDROXY: CPT | Performed by: FAMILY MEDICINE

## 2024-09-09 PROCEDURE — 80061 LIPID PANEL: CPT | Performed by: FAMILY MEDICINE

## 2024-12-11 ENCOUNTER — HOSPITAL ENCOUNTER (OUTPATIENT)
Dept: CT IMAGING | Facility: HOSPITAL | Age: 44
Discharge: HOME | End: 2024-12-11
Attending: FAMILY MEDICINE
Payer: COMMERCIAL

## 2024-12-11 DIAGNOSIS — R51.9 HEADACHE, UNSPECIFIED: ICD-10-CM

## 2024-12-11 PROCEDURE — 70450 CT HEAD/BRAIN W/O DYE: CPT

## 2025-07-06 ENCOUNTER — HOSPITAL ENCOUNTER (EMERGENCY)
Facility: HOSPITAL | Age: 45
Discharge: HOME | End: 2025-07-06
Attending: EMERGENCY MEDICINE | Admitting: EMERGENCY MEDICINE
Payer: COMMERCIAL

## 2025-07-06 ENCOUNTER — APPOINTMENT (OUTPATIENT)
Dept: ULTRASOUND IMAGING | Facility: HOSPITAL | Age: 45
End: 2025-07-06
Attending: EMERGENCY MEDICINE
Payer: COMMERCIAL

## 2025-07-06 ENCOUNTER — APPOINTMENT (OUTPATIENT)
Dept: CT IMAGING | Facility: HOSPITAL | Age: 45
End: 2025-07-06
Attending: EMERGENCY MEDICINE
Payer: COMMERCIAL

## 2025-07-06 VITALS
SYSTOLIC BLOOD PRESSURE: 148 MMHG | RESPIRATION RATE: 16 BRPM | DIASTOLIC BLOOD PRESSURE: 74 MMHG | BODY MASS INDEX: 36.58 KG/M2 | TEMPERATURE: 98.1 F | OXYGEN SATURATION: 98 % | HEIGHT: 66 IN | HEART RATE: 70 BPM | WEIGHT: 227.6 LBS

## 2025-07-06 DIAGNOSIS — M54.50 ACUTE MIDLINE LOW BACK PAIN WITHOUT SCIATICA: ICD-10-CM

## 2025-07-06 DIAGNOSIS — D25.9 UTERINE LEIOMYOMA, UNSPECIFIED LOCATION: ICD-10-CM

## 2025-07-06 DIAGNOSIS — R10.30 ABDOMINAL PAIN, LOWER: ICD-10-CM

## 2025-07-06 LAB
ALBUMIN SERPL BCG-MCNC: 4 G/DL (ref 3.5–5.2)
ALBUMIN UR-MCNC: NEGATIVE MG/DL
ALP SERPL-CCNC: 72 U/L (ref 40–150)
ALT SERPL W P-5'-P-CCNC: 16 U/L (ref 0–50)
ANION GAP SERPL CALCULATED.3IONS-SCNC: 9 MMOL/L (ref 7–15)
APPEARANCE UR: CLEAR
AST SERPL W P-5'-P-CCNC: 13 U/L (ref 0–45)
BILIRUB DIRECT SERPL-MCNC: 0.1 MG/DL (ref 0–0.3)
BILIRUB SERPL-MCNC: 0.3 MG/DL
BILIRUB UR QL STRIP: NEGATIVE
BUN SERPL-MCNC: 8.1 MG/DL (ref 6–20)
CALCIUM SERPL-MCNC: 9 MG/DL (ref 8.8–10.4)
CHLORIDE SERPL-SCNC: 103 MMOL/L (ref 98–107)
COLOR UR AUTO: ABNORMAL
CREAT SERPL-MCNC: 0.58 MG/DL (ref 0.51–0.95)
EGFRCR SERPLBLD CKD-EPI 2021: >90 ML/MIN/1.73M2
ERYTHROCYTE [DISTWIDTH] IN BLOOD BY AUTOMATED COUNT: 14.1 % (ref 10–15)
GLUCOSE SERPL-MCNC: 87 MG/DL (ref 70–99)
GLUCOSE UR STRIP-MCNC: NEGATIVE MG/DL
HCG UR QL: NEGATIVE
HCO3 SERPL-SCNC: 25 MMOL/L (ref 22–29)
HCT VFR BLD AUTO: 40.8 % (ref 35–47)
HGB BLD-MCNC: 13 G/DL (ref 11.7–15.7)
HGB UR QL STRIP: NEGATIVE
HOLD SPECIMEN: NORMAL
HOLD SPECIMEN: NORMAL
KETONES UR STRIP-MCNC: NEGATIVE MG/DL
LEUKOCYTE ESTERASE UR QL STRIP: NEGATIVE
MCH RBC QN AUTO: 29.3 PG (ref 26.5–33)
MCHC RBC AUTO-ENTMCNC: 31.9 G/DL (ref 31.5–36.5)
MCV RBC AUTO: 92 FL (ref 78–100)
MUCOUS THREADS #/AREA URNS LPF: PRESENT /LPF
NITRATE UR QL: NEGATIVE
PH UR STRIP: 5 [PH] (ref 5–7)
PLATELET # BLD AUTO: 375 10E3/UL (ref 150–450)
POTASSIUM SERPL-SCNC: 4.3 MMOL/L (ref 3.4–5.3)
PROT SERPL-MCNC: 7.3 G/DL (ref 6.4–8.3)
RBC # BLD AUTO: 4.44 10E6/UL (ref 3.8–5.2)
RBC URINE: 1 /HPF
SODIUM SERPL-SCNC: 137 MMOL/L (ref 135–145)
SP GR UR STRIP: 1.02 (ref 1–1.03)
SQUAMOUS EPITHELIAL: 7 /HPF
UROBILINOGEN UR STRIP-MCNC: NORMAL MG/DL
WBC # BLD AUTO: 9.2 10E3/UL (ref 4–11)
WBC URINE: 1 /HPF

## 2025-07-06 PROCEDURE — 80048 BASIC METABOLIC PNL TOTAL CA: CPT | Performed by: EMERGENCY MEDICINE

## 2025-07-06 PROCEDURE — 74177 CT ABD & PELVIS W/CONTRAST: CPT

## 2025-07-06 PROCEDURE — 36415 COLL VENOUS BLD VENIPUNCTURE: CPT | Performed by: EMERGENCY MEDICINE

## 2025-07-06 PROCEDURE — 99285 EMERGENCY DEPT VISIT HI MDM: CPT | Mod: 25

## 2025-07-06 PROCEDURE — 81025 URINE PREGNANCY TEST: CPT | Performed by: EMERGENCY MEDICINE

## 2025-07-06 PROCEDURE — 76830 TRANSVAGINAL US NON-OB: CPT

## 2025-07-06 PROCEDURE — 80053 COMPREHEN METABOLIC PANEL: CPT | Performed by: EMERGENCY MEDICINE

## 2025-07-06 PROCEDURE — 85041 AUTOMATED RBC COUNT: CPT | Performed by: EMERGENCY MEDICINE

## 2025-07-06 PROCEDURE — 250N000011 HC RX IP 250 OP 636: Performed by: EMERGENCY MEDICINE

## 2025-07-06 PROCEDURE — 250N000013 HC RX MED GY IP 250 OP 250 PS 637: Performed by: EMERGENCY MEDICINE

## 2025-07-06 PROCEDURE — 81001 URINALYSIS AUTO W/SCOPE: CPT | Performed by: EMERGENCY MEDICINE

## 2025-07-06 RX ORDER — HYDROCODONE BITARTRATE AND ACETAMINOPHEN 5; 325 MG/1; MG/1
1 TABLET ORAL ONCE
Refills: 0 | Status: DISCONTINUED | OUTPATIENT
Start: 2025-07-06 | End: 2025-07-06 | Stop reason: HOSPADM

## 2025-07-06 RX ORDER — CYCLOBENZAPRINE HCL 10 MG
10 TABLET ORAL ONCE
Status: COMPLETED | OUTPATIENT
Start: 2025-07-06 | End: 2025-07-06

## 2025-07-06 RX ORDER — IOPAMIDOL 755 MG/ML
90 INJECTION, SOLUTION INTRAVASCULAR ONCE
Status: COMPLETED | OUTPATIENT
Start: 2025-07-06 | End: 2025-07-06

## 2025-07-06 RX ADMIN — IOPAMIDOL 90 ML: 755 INJECTION, SOLUTION INTRAVENOUS at 13:45

## 2025-07-06 RX ADMIN — CYCLOBENZAPRINE 10 MG: 10 TABLET, FILM COATED ORAL at 12:49

## 2025-07-06 ASSESSMENT — ACTIVITIES OF DAILY LIVING (ADL)
ADLS_ACUITY_SCORE: 41

## 2025-07-06 ASSESSMENT — COLUMBIA-SUICIDE SEVERITY RATING SCALE - C-SSRS
6. HAVE YOU EVER DONE ANYTHING, STARTED TO DO ANYTHING, OR PREPARED TO DO ANYTHING TO END YOUR LIFE?: NO
2. HAVE YOU ACTUALLY HAD ANY THOUGHTS OF KILLING YOURSELF IN THE PAST MONTH?: NO
1. IN THE PAST MONTH, HAVE YOU WISHED YOU WERE DEAD OR WISHED YOU COULD GO TO SLEEP AND NOT WAKE UP?: NO

## 2025-07-06 NOTE — ED PROVIDER NOTES
Emergency Department Encounter     Evaluation Date & Time:   No admission date for patient encounter.    CHIEF COMPLAINT:  Back Pain      Triage Note:Pt reports constipation for the last week. Yesterday she was leaning over her bed when she had a sharp pain in the middle of her low back that radiates to her lower abdomen. Pt has a history of endometrial hyperplasy.      Triage Assessment (Adult)       Row Name 25 1213          Triage Assessment    Airway WDL WDL        Respiratory WDL    Respiratory WDL WDL        Skin Circulation/Temperature WDL    Skin Circulation/Temperature WDL WDL        Cardiac WDL    Cardiac WDL WDL        Peripheral/Neurovascular WDL    Peripheral Neurovascular WDL WDL        Cognitive/Neuro/Behavioral WDL    Cognitive/Neuro/Behavioral WDL WDL                           Impression and Plan       FINAL IMPRESSION:    ICD-10-CM    1. Abdominal pain, lower  R10.30       2. Uterine leiomyoma, unspecified location  D25.9       3. Acute midline low back pain without sciatica  M54.50 Spine  Referral          ED COURSE & MEDICAL DECISION MAKIN:23 PM I met with the patient, obtained history, performed an initial exam, and discussed options and plan for diagnostics and treatment here in the ED.       45 year old female, history of DDD, endometrial hyperplasia and obesity, who presents for evaluation of back and abdominal pain.    Patient reports constipation for ~1 week with associated mild abdominal discomfort. Then yesterday, she was leaning over her bed, when she had a sudden sharp pain in her mid-low back radiating to her lower abdomen that feels like severe constipation or menstrual cramps. The pain is worse when she sits and improves significantly when she is standing or lying in bed. Her last BM was this morning, but she passed only small hard poops. No nausea / vomiting. She did have a menstrual period last week for the first time in 1.5 years.     Abdomen is soft with  mild tenderness to palpation diffusely, most prominently to the suprapubic region.    Her back pain radiates somewhat to her left inner thigh. She is neuro intact without bowel / bladder dysfunction.  I do not suspect cauda equina syndrome, epidural abscess, epidural hematoma, osteomyelitis, transverse myelitis, discitis or other neurosurgical emergency and do not think emergent MRI imaging is indicated.    UPT negative. UA negative for infection and hematuria.    Labs otherwise remarkable for no leukocytosis, anemia, electrolyte derangements or renal impairment.  No laboratory evidence of hepatitis or biliary obstruction.    CT abdomen / pelvis performed and demonstrated no abnormality to explain symptoms, specifically, no inflammatory changes, renal calculi, obstruction or mass. Appendix is normal. Myomatous uterus again noted.    Pelvic ultrasound performed and demonstrated fibroids with 5mm endometrium and no acute abnormalities.    Patient discharged to home with follow-up with her PCP and the [facility] (a referral was ordered). Return precautions provided. Patient stable throughout ED course.      At the conclusion of the encounter I discussed the results of all the tests and the disposition. The questions were answered. The patient and family acknowledged understanding and were agreeable with the care plan.      Medical Decision Making  Discharge. No recommendations on prescription strength medication(s). I considered admission, but ultimately discharged patient given reassuring evaluation.    MIPS (CTPE, Dental pain, Lowry, Sinusitis, Asthma/COPD, Head Trauma): Not Applicable      MEDICATIONS GIVEN IN THE EMERGENCY DEPARTMENT:  Medications   cyclobenzaprine (FLEXERIL) tablet 10 mg (10 mg Oral $Given 7/6/25 1247)   iopamidol (ISOVUE-370) solution 90 mL (90 mLs Intravenous $Given 7/6/25 1345)       NEW PRESCRIPTIONS STARTED AT TODAY'S ED VISIT:  There are no discharge medications for this patient.      HPI  "    [place]     Petty Tolliver is a 45 year old female, history of degenerative disc disease, endometrial hyperplasia and obesity, who presents to this ED via private car for evaluation of back and abdominal pain.    Patient reports constipation for ~1 week with associated mild abdominal discomfort. Then yesterday, she was leaning over her bed, when she had a sudden sharp pain in her mid-low back radiating to her abdomen, primarily her lower abdomen. The pain has been ongoing and feels like severe constipation or menstrual cramps. The pain is worse when she sits and improves significantly when she is standing or lying in bed. Her last bowel movement was this morning, but only passed \"small hard poops\" that are unusual for her. She did have some diarrhea a few days ago. No nausea or vomiting. She also endorses urinary urgency, but denies dysuria. Patient takes progesterone and had a menstrual period last week for the first time in 1.5 years.    She denies lower extremity weakness and paresthesias. Her back pain radiates somewhat to her left inner thigh. She denies urinary retention and incontinence of urine and stool.     Per chart review, patient was seen at [facility] emergency room on 04/14/2024 for epigastric pain radiating to the RUQ. Bedside ultrasound did not show any gallstones or any concerning gallbladder pathology. Patient was pain-free on re-evaluation so no indication for further imaging. Labs were unremarkable. Patient was given famotidine 20 mg and Zofran 8 mg in the ED. She was discharged with instructions to stop omeprazole and start Protonix 40 mg for 30 days, follow-up with GI and PCP, and return to the ED if her pain continued or got worse.     Medical History     No past medical history on file.    No current outpatient medications on file.      Physical Exam     First Vitals:  Patient Vitals for the past 24 hrs:   BP Temp Temp src Pulse Resp SpO2 Height Weight   07/06/25 1818 (!) 148/74 98.1  F " "(36.7  C) Oral 70 16 98 % -- --   07/06/25 1800 -- -- -- 72 -- 96 % -- --   07/06/25 1745 -- -- -- 67 -- 96 % -- --   07/06/25 1730 -- -- -- 59 -- 94 % -- --   07/06/25 1715 -- -- -- 59 -- 94 % -- --   07/06/25 1700 -- -- -- 61 -- 95 % -- --   07/06/25 1645 -- -- -- 60 -- 94 % -- --   07/06/25 1637 (!) 145/70 -- -- 63 -- 96 % -- --   07/06/25 1212 (!) 166/106 97.5  F (36.4  C) Oral 97 18 98 % 1.676 m (5' 6\") 103.2 kg (227 lb 9.6 oz)       PHYSICAL EXAM:   Physical Exam    GENERAL: Awake, alert.  In mild acute distress.   HEENT: Normocephalic, atraumatic.   NECK: No stridor.  PULMONARY: Symmetrical breath sounds without distress.  Lungs clear to auscultation bilaterally without wheezes, rhonchi or rales.  CARDIO: Borderline tachycardic rate with regular rhythm.  No significant murmur, rub or gallop.   ABDOMINAL: Abdomen soft, non-distended with mild tenderness to palpation diffusely, most prominently to the suprapubic region; no rebound tenderness or guarding. No CVAT, BL.  BACK:  No midline tenderness to palpation of lumbar spine.   EXTREMITIES: No lower extremity swelling or edema.      NEURO: Alert and oriented to person, place and time.  Cranial nerves grossly intact.  Strength 5/5 BL lower extremities (hip flexion, knee flexion / extension, plantarflexion / dorsiflexion ankles and great toes) with sensation to light touch grossly intact.  Normal gait. Patient able to stand on toes and heels.   PSYCH: Normal mood and affect.      Results     LAB:  All pertinent labs reviewed and interpreted  Labs Ordered and Resulted from Time of ED Arrival to Time of ED Departure   ROUTINE UA WITH MICROSCOPIC REFLEX TO CULTURE - Abnormal       Result Value    Color Urine Light Yellow      Appearance Urine Clear      Glucose Urine Negative      Bilirubin Urine Negative      Ketones Urine Negative      Specific Gravity Urine 1.017      Blood Urine Negative      pH Urine 5.0      Protein Albumin Urine Negative      Urobilinogen " Urine Normal      Nitrite Urine Negative      Leukocyte Esterase Urine Negative      Mucus Urine Present (*)     RBC Urine 1      WBC Urine 1      Squamous Epithelials Urine 7 (*)    HCG QUALITATIVE URINE - Normal    hCG Urine Qualitative Negative     CBC WITH PLATELETS - Normal    WBC Count 9.2      RBC Count 4.44      Hemoglobin 13.0      Hematocrit 40.8      MCV 92      MCH 29.3      MCHC 31.9      RDW 14.1      Platelet Count 375     BASIC METABOLIC PANEL - Normal    Sodium 137      Potassium 4.3      Chloride 103      Carbon Dioxide (CO2) 25      Anion Gap 9      Urea Nitrogen 8.1      Creatinine 0.58      GFR Estimate >90      Calcium 9.0      Glucose 87     HEPATIC FUNCTION PANEL - Normal    Protein Total 7.3      Albumin 4.0      Bilirubin Total 0.3      Alkaline Phosphatase 72      AST 13      ALT 16      Bilirubin Direct 0.10         RADIOLOGY:  US Pelvic Complete with Transvaginal   Final Result   IMPRESSION:   1.  No acute abnormalities.   2.  Fibroid uterus.               CT Abdomen Pelvis w Contrast   Final Result   IMPRESSION:    1.  No abnormality seen to explain symptoms.   2.  Specifically, no inflammatory changes, renal calculi, obstruction or mass.   3.  Appendix is normal.   4.  Myomatous uterus again noted.             I, Natividad Andrea, am serving as a scribe to document services personally performed by Niyah Villegas MD based on my observation and the provider's statements to me. I, Niyah Villegas MD attest that Natividad Andrea is acting in a scribe capacity, has observed my performance of the services and has documented them in accordance with my direction.    Niyah Villegas MD  Emergency Medicine  [facility] EMERGENCY DEPARTMENT           Niyah Villegas MD  07/07/25 1038

## 2025-07-06 NOTE — DISCHARGE INSTRUCTIONS
Please follow-up with your Primary Care Provider within one week for a recheck; call to arrange appointment.    Please follow-up with the Spine Clinic within 1-2 weeks; they should call you to arrange appointment.    Return to the ER for worsening symptoms, worsening abdominal pain, worsening back pain, weakness or numbness in either leg, if you are unable to empty your bladder, if you lose control of urine or stool, fever or other concerns.     You can try over-the-counter lidocaine patches for your back pain (Salonpas) as well as Tylenol and / or ibuprofen.

## 2025-07-06 NOTE — ED TRIAGE NOTES
Pt reports constipation for the last week. Yesterday she was leaning over her bed when she had a sharp pain in the middle of her low back that radiates to her lower abdomen. Pt has a history of endometrial hyperplasy.      Triage Assessment (Adult)       Row Name 07/06/25 1213          Triage Assessment    Airway WDL WDL        Respiratory WDL    Respiratory WDL WDL        Skin Circulation/Temperature WDL    Skin Circulation/Temperature WDL WDL        Cardiac WDL    Cardiac WDL WDL        Peripheral/Neurovascular WDL    Peripheral Neurovascular WDL WDL        Cognitive/Neuro/Behavioral WDL    Cognitive/Neuro/Behavioral WDL WDL

## 2025-07-21 ENCOUNTER — LAB REQUISITION (OUTPATIENT)
Dept: LAB | Facility: CLINIC | Age: 45
End: 2025-07-21

## 2025-07-21 DIAGNOSIS — L82.1 OTHER SEBORRHEIC KERATOSIS: ICD-10-CM

## 2025-07-21 PROCEDURE — 88305 TISSUE EXAM BY PATHOLOGIST: CPT | Performed by: PATHOLOGY

## 2025-07-23 LAB
PATH REPORT.COMMENTS IMP SPEC: NORMAL
PATH REPORT.FINAL DX SPEC: NORMAL
PATH REPORT.GROSS SPEC: NORMAL
PATH REPORT.MICROSCOPIC SPEC OTHER STN: NORMAL
PATH REPORT.RELEVANT HX SPEC: NORMAL
PHOTO IMAGE: NORMAL

## 2025-08-26 ENCOUNTER — HOSPITAL ENCOUNTER (EMERGENCY)
Facility: HOSPITAL | Age: 45
Discharge: HOME | End: 2025-08-26
Attending: EMERGENCY MEDICINE | Admitting: EMERGENCY MEDICINE
Payer: COMMERCIAL

## 2025-08-26 VITALS
TEMPERATURE: 98.5 F | DIASTOLIC BLOOD PRESSURE: 65 MMHG | RESPIRATION RATE: 20 BRPM | SYSTOLIC BLOOD PRESSURE: 135 MMHG | HEART RATE: 66 BPM | OXYGEN SATURATION: 96 %
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
## 2025-08-26 LAB
ALBUMIN UR-MCNC: NEGATIVE MG/DL
APPEARANCE UR: CLEAR
BACTERIA #/AREA URNS HPF: ABNORMAL /HPF
BILIRUB UR QL STRIP: NEGATIVE
COLOR UR AUTO: COLORLESS
GLUCOSE UR STRIP-MCNC: NEGATIVE MG/DL
HBV CORE AB SERPL QL IA: NONREACTIVE
HBV SURFACE AB SERPL IA-ACNC: 71.1 M[IU]/ML
HBV SURFACE AB SERPL IA-ACNC: REACTIVE M[IU]/ML
HBV SURFACE AG SERPL QL IA: NONREACTIVE
HCG UR QL: NEGATIVE
HCV AB SERPL QL IA: NONREACTIVE
HGB UR QL STRIP: ABNORMAL
HIV 1+2 AB+HIV1 P24 AG SERPL QL IA: NONREACTIVE
KETONES UR STRIP-MCNC: NEGATIVE MG/DL
LEUKOCYTE ESTERASE UR QL STRIP: ABNORMAL
NITRATE UR QL: NEGATIVE
PH UR STRIP: 5 [PH] (ref 5–7)
RBC URINE: 1 /HPF
SP GR UR STRIP: 1.01 (ref 1–1.03)
SQUAMOUS EPITHELIAL: 2 /HPF
T VAGINALIS DNA SPEC QL NAA+PROBE: NOT DETECTED
UROBILINOGEN UR STRIP-MCNC: NORMAL MG/DL
WBC URINE: 3 /HPF

## 2025-08-26 PROCEDURE — G0010 ADMIN HEPATITIS B VACCINE: HCPCS | Performed by: EMERGENCY MEDICINE

## 2025-08-26 PROCEDURE — 99284 EMERGENCY DEPT VISIT MOD MDM: CPT | Mod: 25 | Performed by: EMERGENCY MEDICINE

## 2025-08-26 PROCEDURE — 250N000009 HC RX 250: Performed by: EMERGENCY MEDICINE

## 2025-08-26 PROCEDURE — 99284 EMERGENCY DEPT VISIT MOD MDM: CPT | Performed by: EMERGENCY MEDICINE

## 2025-08-26 PROCEDURE — 86706 HEP B SURFACE ANTIBODY: CPT | Performed by: EMERGENCY MEDICINE

## 2025-08-26 PROCEDURE — 96372 THER/PROPH/DIAG INJ SC/IM: CPT | Performed by: EMERGENCY MEDICINE

## 2025-08-26 PROCEDURE — 250N000013 HC RX MED GY IP 250 OP 250 PS 637: Performed by: EMERGENCY MEDICINE

## 2025-08-26 PROCEDURE — 36415 COLL VENOUS BLD VENIPUNCTURE: CPT | Performed by: EMERGENCY MEDICINE

## 2025-08-26 PROCEDURE — 87661 TRICHOMONAS VAGINALIS AMPLIF: CPT | Performed by: EMERGENCY MEDICINE

## 2025-08-26 PROCEDURE — 86780 TREPONEMA PALLIDUM: CPT | Performed by: EMERGENCY MEDICINE

## 2025-08-26 PROCEDURE — 86803 HEPATITIS C AB TEST: CPT | Performed by: EMERGENCY MEDICINE

## 2025-08-26 PROCEDURE — 87340 HEPATITIS B SURFACE AG IA: CPT | Performed by: EMERGENCY MEDICINE

## 2025-08-26 PROCEDURE — 87389 HIV-1 AG W/HIV-1&-2 AB AG IA: CPT | Performed by: EMERGENCY MEDICINE

## 2025-08-26 PROCEDURE — 87491 CHLMYD TRACH DNA AMP PROBE: CPT | Performed by: EMERGENCY MEDICINE

## 2025-08-26 PROCEDURE — 250N000011 HC RX IP 250 OP 636: Performed by: EMERGENCY MEDICINE

## 2025-08-26 PROCEDURE — 90746 HEPB VACCINE 3 DOSE ADULT IM: CPT | Performed by: EMERGENCY MEDICINE

## 2025-08-26 PROCEDURE — 81001 URINALYSIS AUTO W/SCOPE: CPT | Performed by: EMERGENCY MEDICINE

## 2025-08-26 PROCEDURE — 87591 N.GONORRHOEAE DNA AMP PROB: CPT | Performed by: EMERGENCY MEDICINE

## 2025-08-26 PROCEDURE — 86704 HEP B CORE ANTIBODY TOTAL: CPT | Performed by: EMERGENCY MEDICINE

## 2025-08-26 PROCEDURE — 81025 URINE PREGNANCY TEST: CPT | Performed by: EMERGENCY MEDICINE

## 2025-08-26 RX ORDER — METRONIDAZOLE 500 MG/1
500 TABLET ORAL 2 TIMES DAILY
Qty: 14 TABLET | Refills: 0 | Status: SHIPPED | OUTPATIENT
Start: 2025-08-26 | End: 2025-09-02

## 2025-08-26 RX ORDER — DOXYCYCLINE 100 MG/1
100 CAPSULE ORAL 2 TIMES DAILY
Qty: 14 CAPSULE | Refills: 0 | Status: SHIPPED | OUTPATIENT
Start: 2025-08-26 | End: 2025-09-02

## 2025-08-26 RX ORDER — EMTRICITABINE AND TENOFOVIR DISOPROXIL FUMARATE 200; 300 MG/1; MG/1
1 TABLET, FILM COATED ORAL DAILY
Qty: 30 TABLET | Refills: 0 | Status: SHIPPED | OUTPATIENT
Start: 2025-08-26 | End: 2025-09-25

## 2025-08-26 RX ORDER — EMTRICITABINE AND TENOFOVIR DISOPROXIL FUMARATE 200; 300 MG/1; MG/1
1 TABLET, FILM COATED ORAL ONCE
Status: COMPLETED | OUTPATIENT
Start: 2025-08-26 | End: 2025-08-26

## 2025-08-26 RX ADMIN — LIDOCAINE HYDROCHLORIDE 500 MG: 10 INJECTION, SOLUTION INFILTRATION; PERINEURAL at 22:05

## 2025-08-26 RX ADMIN — EMTRICITABINE AND TENOFOVIR DISOPROXIL FUMARATE 1 TABLET: 200; 300 TABLET, FILM COATED ORAL at 18:14

## 2025-08-26 RX ADMIN — HEPATITIS B VACCINE (RECOMBINANT) 20 MCG: 20 INJECTION, SUSPENSION INTRAMUSCULAR at 18:16

## 2025-08-26 RX ADMIN — DOLUTEGRAVIR SODIUM 50 MG: 50 TABLET, FILM COATED ORAL at 18:14

## 2025-08-26 ASSESSMENT — ACTIVITIES OF DAILY LIVING (ADL)
ADLS_ACUITY_SCORE: 41

## 2025-08-27 LAB
C TRACH DNA SPEC QL NAA+PROBE: NEGATIVE
N GONORRHOEA DNA SPEC QL NAA+PROBE: NEGATIVE
SPECIMEN TYPE: NORMAL
SPECIMEN TYPE: NORMAL
T PALLIDUM AB SER QL: NONREACTIVE